# Patient Record
Sex: FEMALE | Race: WHITE
[De-identification: names, ages, dates, MRNs, and addresses within clinical notes are randomized per-mention and may not be internally consistent; named-entity substitution may affect disease eponyms.]

---

## 2018-04-28 ENCOUNTER — HOSPITAL ENCOUNTER (OUTPATIENT)
Dept: HOSPITAL 38 - CC.ED | Age: 63
Setting detail: OBSERVATION
LOS: 1 days | Discharge: HOME | End: 2018-04-29
Attending: FAMILY MEDICINE | Admitting: NURSE PRACTITIONER
Payer: COMMERCIAL

## 2018-04-28 DIAGNOSIS — Z87.891: ICD-10-CM

## 2018-04-28 DIAGNOSIS — R10.13: Primary | ICD-10-CM

## 2018-04-28 DIAGNOSIS — E78.00: ICD-10-CM

## 2018-04-28 DIAGNOSIS — I10: ICD-10-CM

## 2018-04-28 DIAGNOSIS — Z90.710: ICD-10-CM

## 2018-04-28 DIAGNOSIS — Z90.49: ICD-10-CM

## 2018-04-28 DIAGNOSIS — K21.9: ICD-10-CM

## 2018-04-28 LAB
CHLORIDE SERPL-SCNC: 102 MEQ/L (ref 98–106)
SODIUM SERPL-SCNC: 141 MEQ/L (ref 136–145)

## 2018-04-28 PROCEDURE — 85610 PROTHROMBIN TIME: CPT

## 2018-04-28 PROCEDURE — 81001 URINALYSIS AUTO W/SCOPE: CPT

## 2018-04-28 PROCEDURE — 96375 TX/PRO/DX INJ NEW DRUG ADDON: CPT

## 2018-04-28 PROCEDURE — 85379 FIBRIN DEGRADATION QUANT: CPT

## 2018-04-28 PROCEDURE — 96374 THER/PROPH/DIAG INJ IV PUSH: CPT

## 2018-04-28 PROCEDURE — 83615 LACTATE (LD) (LDH) ENZYME: CPT

## 2018-04-28 PROCEDURE — 99285 EMERGENCY DEPT VISIT HI MDM: CPT

## 2018-04-28 PROCEDURE — 74177 CT ABD & PELVIS W/CONTRAST: CPT

## 2018-04-28 PROCEDURE — 36415 COLL VENOUS BLD VENIPUNCTURE: CPT

## 2018-04-28 PROCEDURE — 82550 ASSAY OF CK (CPK): CPT

## 2018-04-28 PROCEDURE — 82150 ASSAY OF AMYLASE: CPT

## 2018-04-28 PROCEDURE — 96376 TX/PRO/DX INJ SAME DRUG ADON: CPT

## 2018-04-28 PROCEDURE — 93005 ELECTROCARDIOGRAM TRACING: CPT

## 2018-04-28 PROCEDURE — G0378 HOSPITAL OBSERVATION PER HR: HCPCS

## 2018-04-28 PROCEDURE — C9113 INJ PANTOPRAZOLE SODIUM, VIA: HCPCS

## 2018-04-28 PROCEDURE — 86140 C-REACTIVE PROTEIN: CPT

## 2018-04-28 PROCEDURE — 80048 BASIC METABOLIC PNL TOTAL CA: CPT

## 2018-04-28 PROCEDURE — 71046 X-RAY EXAM CHEST 2 VIEWS: CPT

## 2018-04-28 PROCEDURE — 80053 COMPREHEN METABOLIC PANEL: CPT

## 2018-04-28 PROCEDURE — 85025 COMPLETE CBC W/AUTO DIFF WBC: CPT

## 2018-04-28 PROCEDURE — 84484 ASSAY OF TROPONIN QUANT: CPT

## 2018-04-28 NOTE — EDM.PDOC
ED HPI GENERAL MEDICAL PROBLEM





- General


Chief Complaint: Chest Pain


Stated Complaint: pressure, ? MI


Time Seen by Provider: 04/28/18 07:04


Source of Information: Reports: Patient


History Limitations: Reports: No Limitations





- History of Present Illness


INITIAL COMMENTS - FREE TEXT/NARRATIVE: 





This patient is a 63 year old female that presents to the ER. Patient reports 

that started about 3 days ago with achy pressure in her middle back between the 

shouder blades. She reports the pressure sensation wrapped around bilateral 

sides into her chest. She reports the pain is also to the left neck, this pain 

is easily manipulated with ROM of the neck. Patient reports that she has also 

been nauseated the last 3 days. Patient reports that the pain comes and goes, 

waxes and wanes. She reports the pain yesterday afternoon while in her kitchen 

was a 8/10. She reports that she does drink alcohol about 3 cocktails a day. 

She denies smoking. She reports history of HTN and high cholesterol. She 

reports that she has history of ulcer several years ago. The patient is alert 

and oriented. She is conversing in full and complete sentences without 

difficulty. The patient reports her pain in the ER is 2 out of 10 on scale. The 

patient does report she has had pain in Epigastric region that feels like 

pressure, gnawing, and acid reflux she reports. She reports this has been there 

for about 3 days when this other pain began as well per patient. She reports 

she has also had diarrhea for about 2 weeks. 


Onset Date: 04/25/18


Duration: Day(s): (3)


Location: Reports: Chest, Abdomen, Back


Quality: Reports: Pressure


Severity: Mild


Improves with: Reports: None


Worsens with: Reports: None


Associated Symptoms: Reports: Chest Pain.  Denies: Confusion, Cough, cough w 

sputum


  ** Upper Back


Pain Score (Numeric/FACES): 3





- Related Data


 Allergies











Allergy/AdvReac Type Severity Reaction Status Date / Time


 


No Known Allergies Allergy   Verified 04/28/18 06:50











Home Meds: 


 Home Meds





. [No Known Home Meds]  04/28/18 [History]











Past Medical History





- Past Surgical History


GI Surgical History: Reports: Appendectomy, Cholecystectomy


Female  Surgical History: Reports: Hysterectomy





Social & Family History





- Tobacco Use


Smoking Status *Q: Former Smoker


Used Tobacco, but Quit: Yes


Month/Year Tobacco Last Used: 02/2018





- Caffeine Use


Caffeine Use: Reports: Coffee





- Alcohol Use


Days Per Week of Alcohol Use: 7


Number of Drinks Per Day: 7


Total Drinks Per Week: 49





- Recreational Drug Use


Recreational Drug Use: No





ED ROS GENERAL





- Review of Systems


Review Of Systems: See Below


Constitutional: Reports: No Symptoms


HEENT: Reports: No Symptoms


Respiratory: Reports: No Symptoms.  Denies: Shortness of Breath


Cardiovascular: Reports: Chest Pain.  Denies: Dyspnea on Exertion, Edema, 

Lightheadedness, Palpitations, Syncope


Endocrine: Reports: No Symptoms


GI/Abdominal: Reports: Abdominal Pain (Epigastric), Diarrhea, Nausea.  Denies: 

Black Stool, Bloody Stool, Constipation, Decreased Appetite, Difficulty 

Swallowing, Distension, Stool Incontinence, Vomiting


: Reports: No Symptoms


Musculoskeletal: Reports: Neck Pain (left lateral neck), Back Pain (between 

shoulder blades)


Skin: Reports: No Symptoms


Neurological: Reports: No Symptoms


Psychiatric: Reports: No Symptoms


Hematologic/Lymphatic: Reports: No Symptoms


Immunologic: Reports: No Symptoms





ED EXAM, GENERAL





- Physical Exam


Exam: See Below


Exam Limited By: No Limitations


General Appearance: Alert, WD/WN, No Apparent Distress


Eye Exam: Bilateral Eye: Normal Inspection, PERRL


Ears: Normal External Exam, Normal Canal, Hearing Grossly Normal, Normal TMs


Ear Exam: Bilateral Ear: Auricle Normal, Canal Normal, TM normal


Nose: Normal Inspection, Normal Mucosa, No Blood


Throat/Mouth: Normal Inspection, Normal Lips, Normal Teeth, Normal Gums, Normal 

Oropharynx, Normal Voice, No Airway Compromise


Head: Atraumatic, Normocephalic


Neck: Normal Inspection, Supple, Non-Tender, Full Range of Motion, Other (Pain 

of the left lateral neck is made worse with ROM. Easily manipulated on exam. )


Respiratory/Chest: No Respiratory Distress, Lungs Clear, Normal Breath Sounds, 

No Accessory Muscle Use, Chest Non-Tender


Cardiovascular: Normal Peripheral Pulses, Regular Rate, Rhythm, No Edema, No 

Gallop, No JVD, No Murmur, No Rub


GI/Abdominal: Normal Bowel Sounds, Soft, No Organomegaly, No Distention, No 

Abnormal Bruit, No Mass, Pelvis Stable, Tender (Epigastric).  No: Guarding, 

Rebound, Hernia, Hepatomegaly, Splenomegaly


 (Female) Exam: Deferred


Rectal (Female) Exam: Deferred


Back Exam: Normal Inspection, Full Range of Motion.  No: CVA Tenderness (L), 

CVA Tenderness (R), Decreased Range of Motion, Muscle Spasm, Paraspinal 

Tenderness, Vertebral Tenderness


Extremities: Normal Inspection, Normal Range of Motion, Non-Tender, No Pedal 

Edema, Normal Capillary Refill


Neurological: Alert, Oriented, Normal Cognition, Normal Gait, No Motor/Sensory 

Deficits


Psychiatric: Normal Affect, Normal Mood


Skin Exam: Warm, Dry, Intact, Normal Color, No Rash


Lymphatic: No Adenopathy





EKG INTERPRETATION


EKG Date: 04/28/18


Time: 06:46


Rhythm: NSR


Rate (Beats/Min): 81


Axis: Normal


ST-T: Normal


QT: Normal


Comparison: NA - No Prior EKG





Course





- Vital Signs


Last Recorded V/S: 


 Last Vital Signs











Temp  96.3 F   04/28/18 06:50


 


Pulse  98   04/28/18 10:16


 


Resp  18   04/28/18 10:16


 


BP  172/88 H  04/28/18 10:16


 


Pulse Ox  98   04/28/18 10:16














- Orders/Labs/Meds


Orders: 


 Active Orders 24 hr











 Category Date Time Status


 


 Patient Status [ADT] Routine ADT  04/28/18 16:01 Active


 


 Ambulate [RC] ASDIRECTED Care  04/28/18 16:01 Active


 


 Ambulate [RC] PER UNIT ROUTINE Care  04/28/18 16:01 Active


 


 EKG Documentation Completion [RC] STAT Care  04/28/18 12:55 Active


 


 Notify Provider Vital Signs [RC] ASDIRECTED Care  04/28/18 16:01 Active


 


 Oxygen Therapy [RC] PRN Care  04/28/18 16:01 Active


 


 VTE/DVT Education [RC] PER UNIT ROUTINE Care  04/28/18 16:01 Active


 


 Vital Signs [RC] Q4H Care  04/28/18 16:01 Active


 


 Clear Liquid Diet [DIET] Diet  04/28/18 Dinner Active


 


 Abdomen Pelvis w Cont [CT] Stat Exams  04/28/18 13:10 Taken


 


 Chest 2V [CR] Stat Exams  04/28/18 06:57 Taken


 


 C-REACTIVE PROTEIN [CHEM] DAILY Lab  04/29/18 05:00 Ordered


 


 CBC WITH AUTO DIFF [HEME] DAILY Lab  04/29/18 05:00 Ordered


 


 CBC WITH AUTO DIFF [HEME] DAILY Lab  04/30/18 05:00 Ordered


 


 COMPREHENSIVE METABOLIC PN,CMP [CHEM] DAILY Lab  04/29/18 05:00 Ordered


 


 URINALYSIS W/MICROSCOPIC [UA W/MICROSCOPIC] [URIN] Stat Lab  04/28/18 07:36 

Ordered


 


 Enoxaparin [Lovenox] Med  04/28/18 16:00 Active





 40 mg SUBCUT Q24H   


 


 Morphine Med  04/28/18 16:01 Active





 2 mg IVPUSH Q2H PRN   


 


 Ondansetron [Zofran] Med  04/28/18 16:01 Active





 4 mg IV Q6H PRN   


 


 Pantoprazole [ProTONIX IV***] 80 mg Med  04/28/18 16:01 Active





 Sodium Chloride 0.9% [Normal Saline] 100 ml   





 IV .CONTINUOUS   


 


 Sodium Chloride 0.9% [Normal Saline] 1,000 ml Med  04/28/18 16:01 Active





 IV ASDIRECTED   


 


 Resuscitation Status Routine Resus Stat  04/28/18 15:38 Ordered








 Medication Orders





Enoxaparin Sodium (Lovenox)  40 mg SUBCUT Q24H KIMMY


Pantoprazole Sodium 80 mg/ (Sodium Chloride)  100 mls @ 10 mls/hr IV 

.CONTINUOUS KIMMY


Sodium Chloride (Normal Saline)  1,000 mls @ 90 mls/hr IV ASDIRECTED KIMMY


Morphine Sulfate (Morphine)  2 mg IVPUSH Q2H PRN


   PRN Reason: Pain (severe 7-10)


Ondansetron HCl (Zofran)  4 mg IV Q6H PRN


   PRN Reason: Nausea/Vomiting








Labs: 


 Laboratory Tests











  04/28/18 04/28/18 04/28/18 Range/Units





  06:50 06:57 06:57 


 


WBC  12.6 H    (5.0-10.0)  10^3/uL


 


RBC  4.53    (4.00-5.50)  10^6/uL


 


Hgb  14.9    (12.0-16.0)  g/dL


 


Hct  44.5    (37.0-47.0)  %


 


MCV  98.2 H    (82.0-94.0)  fL


 


MCH  32.9 H    (27.0-32.0)  pg


 


MCHC  33.5    (33.0-38.0)  g/dL


 


RDW Coeff of Lin  13.8    (11.0-15.0)  %


 


Plt Count  358    (150-400)  10^3/uL


 


Neut % (Auto)  74.4    (35-85)  %


 


Lymph % (Auto)  17.8    (10-55)  %


 


Mono % (Auto)  6.0    (0-16)  %


 


Eos % (Auto)  1.6    (0-5)  %


 


Baso % (Auto)  0.2    (0-3)  %


 


Neut # (Auto)  9.33 H    (1.80-7.00)  10^3/uL


 


Lymph # (Auto)  2.24    (1.00-4.80)  10^3/uL


 


Mono # (Auto)  0.75    (0.00-0.80)  10^3/uL


 


Eos # (Auto)  0.20    (0.00-0.45)  10^3/uL


 


Baso # (Auto)  0.03    10^3/uL


 


PT   9.7   (9.7-12.3)  SEC


 


INR   0.93   (0.92-1.18)  


 


D-Dimer, Quantitative   < 0.19   (0.00-0.50)  


 


Sodium    141  (136-145)  mEq/L


 


Potassium    4.1  (3.5-5.0)  mEq/L


 


Chloride    102  ()  mEq/L


 


Carbon Dioxide    29  (21-32)  mmol/L


 


BUN    12  (7-18)  mg/dL


 


Creatinine    0.9  (0.6-1.0)  mg/dL


 


Est Cr Clr Drug Dosing    47.65  mL/min


 


Estimated GFR (MDRD)    > 60  (>=60)  mL/min


 


Glucose    134 H  (75-99)  mg/dL


 


Calcium    8.9  (8.4-10.1)  mg/dL


 


Lactate Dehydrogenase    220 H  (100-190)  U/L


 


Creatine Kinase    86  ()  U/L


 


Troponin I    < 0.017  (0.00-0.06)  ng/mL


 


Amylase    54  ()  U/L


 


Urine Color     (YELLOW)  


 


Urine Appearance     (CLEAR)  


 


Urine pH     (4.5-8.0)  


 


Ur Specific Gravity     (1.003-1.020)  


 


Urine Protein     (NEGATIVE)  mg/dL


 


Urine Glucose (UA)     (NEGATIVE)  mg/dL


 


Urine Ketones     (NEGATIVE)  mg/dL


 


Urine Occult Blood     (NEGATIVE)  


 


Urine Nitrite     (NEGATIVE)  


 


Urine Bilirubin     (NEGATIVE)  


 


Urine Urobilinogen     (0.2-1.0)  EU/dL


 


Ur Leukocyte Esterase     (NEGATIVE)  


 


Urine RBC     (0-5)  /HPF


 


Urine WBC     (0-5)  /HPF














  04/28/18 04/28/18 04/28/18 Range/Units





  07:36 09:08 11:00 


 


WBC     (5.0-10.0)  10^3/uL


 


RBC     (4.00-5.50)  10^6/uL


 


Hgb     (12.0-16.0)  g/dL


 


Hct     (37.0-47.0)  %


 


MCV     (82.0-94.0)  fL


 


MCH     (27.0-32.0)  pg


 


MCHC     (33.0-38.0)  g/dL


 


RDW Coeff of Lin     (11.0-15.0)  %


 


Plt Count     (150-400)  10^3/uL


 


Neut % (Auto)     (35-85)  %


 


Lymph % (Auto)     (10-55)  %


 


Mono % (Auto)     (0-16)  %


 


Eos % (Auto)     (0-5)  %


 


Baso % (Auto)     (0-3)  %


 


Neut # (Auto)     (1.80-7.00)  10^3/uL


 


Lymph # (Auto)     (1.00-4.80)  10^3/uL


 


Mono # (Auto)     (0.00-0.80)  10^3/uL


 


Eos # (Auto)     (0.00-0.45)  10^3/uL


 


Baso # (Auto)     10^3/uL


 


PT     (9.7-12.3)  SEC


 


INR     (0.92-1.18)  


 


D-Dimer, Quantitative     (0.00-0.50)  


 


Sodium     (136-145)  mEq/L


 


Potassium     (3.5-5.0)  mEq/L


 


Chloride     ()  mEq/L


 


Carbon Dioxide     (21-32)  mmol/L


 


BUN     (7-18)  mg/dL


 


Creatinine     (0.6-1.0)  mg/dL


 


Est Cr Clr Drug Dosing     mL/min


 


Estimated GFR (MDRD)     (>=60)  mL/min


 


Glucose     (75-99)  mg/dL


 


Calcium     (8.4-10.1)  mg/dL


 


Lactate Dehydrogenase     (100-190)  U/L


 


Creatine Kinase     ()  U/L


 


Troponin I   < 0.017  < 0.017  (0.00-0.06)  ng/mL


 


Amylase     ()  U/L


 


Urine Color  Yellow    (YELLOW)  


 


Urine Appearance  Clear    (CLEAR)  


 


Urine pH  6.0    (4.5-8.0)  


 


Ur Specific Gravity  1.025 H    (1.003-1.020)  


 


Urine Protein  Negative    (NEGATIVE)  mg/dL


 


Urine Glucose (UA)  Negative    (NEGATIVE)  mg/dL


 


Urine Ketones  Negative    (NEGATIVE)  mg/dL


 


Urine Occult Blood  Negative    (NEGATIVE)  


 


Urine Nitrite  Negative    (NEGATIVE)  


 


Urine Bilirubin  Negative    (NEGATIVE)  


 


Urine Urobilinogen  0.2    (0.2-1.0)  EU/dL


 


Ur Leukocyte Esterase  Negative    (NEGATIVE)  


 


Urine RBC  Not seen    (0-5)  /HPF


 


Urine WBC  Not seen    (0-5)  /HPF











Meds: 


Medications











Generic Name Dose Route Start Last Admin





  Trade Name Freq  PRN Reason Stop Dose Admin


 


Enoxaparin Sodium  40 mg  04/28/18 16:00  





  Lovenox  SUBCUT   





  Q24H KIMMY   





     





     





     





     


 


Pantoprazole Sodium 80 mg/  100 mls @ 10 mls/hr  04/28/18 16:01  





  Sodium Chloride  IV   





  .CONTINUOUS KIMMY   





     





     





     





     


 


Sodium Chloride  1,000 mls @ 90 mls/hr  04/28/18 16:01  





  Normal Saline  IV   





  ASDIRECTED [facility]   





     





     





     





     


 


Morphine Sulfate  2 mg  04/28/18 16:01  





  Morphine  IVPUSH   





  Q2H PRN   





  Pain (severe 7-10)   





     





     





     


 


Ondansetron HCl  4 mg  04/28/18 16:01  





  Zofran  IV   





  Q6H PRN   





  Nausea/Vomiting   





     





     





     














Discontinued Medications














Generic Name Dose Route Start Last Admin





  Trade Name Freq  PRN Reason Stop Dose Admin


 


Iopamidol  100 ml  04/28/18 13:31  04/28/18 13:41





  Isovue-300 (61%)  IVPUSH  04/28/18 13:32  100 ml





  ONETIME ONE   Administration





     





     





     





     


 


Morphine Sulfate  4 mg  04/28/18 11:33  04/28/18 11:52





  Morphine  IVPUSH  04/28/18 11:34  4 mg





  ONETIME ONE   Administration





     





     





     





     


 


Morphine Sulfate  4 mg  04/28/18 13:13  04/28/18 13:25





  Morphine  IVPUSH  04/28/18 13:14  4 mg





  ONETIME ONE   Administration





     





     





     





     


 


Ondansetron HCl  4 mg  04/28/18 11:33  04/28/18 11:53





  Zofran  IVPUSH  04/28/18 11:34  4 mg





  NOW STA   Administration





     





     





     





     


 


Ondansetron HCl  4 mg  04/28/18 13:13  04/28/18 13:25





  Zofran  IVPUSH  04/28/18 13:14  4 mg





  NOW STA   Administration





     





     





     





     


 


Pantoprazole Sodium  80 mg  04/28/18 08:04  04/28/18 09:12





  Protonix Iv***  IVPUSH  04/28/18 08:05  80 mg





  ONETIME ONE   Administration





     





     





     





     


 


Sucralfate  1 gm  04/28/18 11:33  04/28/18 11:52





  Carafate  PO  04/28/18 11:34  1 gm





  ONETIME ONE   Administration





     





     





     





     














- Radiology Interpretation


Free Text/Narrative:: 





CXR: No acute findings.





- Re-Assessments/Exams


Free Text/Narrative Re-Assessment/Exam: 





04/28/18 08:05


Patient pain described in epigastric region with gnawing and nausea to the 

back. Patent has mild wbc elevation. I will ct the patient due to these 

findings. Ulcer is strong possible diagnosis. Perforation is less likely, but 

possible. Her BP is elevated, HR is normal. Vitals stable. The patient pain is 

easily manipulated by palpating the epigastric region. I do not believe this 

cardiac in nature with pain being manipulated. 





04/28/18 08:09


The patient has refused the CT scan. So, at this time will treat as ulcer. I 

will give her Protonix IV. I will redraw timed cardiac enzymes of troponin. If 

these are negative, will discharge home. Will educate patient to return for 

death, worsening of conditions, or any other concerns. I will have her see her 

PCP for close followup on Monday or GI for possible Ulcer. Explained to the 

patient risks of not having CT. I encouraged her to have this test, but she 

refuses.





04/28/18 12:42


Patient reports after Carafate, Zofran, Morphine that her pain is completely 

resolved. She has had three negative troponins. I will discharge patient. 





04/28/18 13:32


The patient now at discharge reports he pain has returned and is a 9/10. I have 

talked to the patient again. Again, I encourage her to have a CT scan 

performed. Now, she will have this test done. She has agreed to have it done. I 

have given the patient more pain medication, delayed her discharge, and will 

CT. Patient reports she believed the Carafate made her pain worse. 





04/28/18 15:33


I have reviewed patients ct report with radiologist. The patient has told me 

that she does not want to know about ct results if any cancer concerns are 

involved. She has stressed that she does not want me to tell her this 

information. I will allow her PCP to discuss further results with patient. So, 

I have told her that she has a fatty liver on ct and no other explanation for 

her epigastric pain at this time via CT. I did tell her that I believe ulcer is 

a possibility for her pain. I did offer her admission due to her return of 

pain. I feel that if she goes home, pain will return and she will return to the 

ER. The patient reports that she does not see doctors or take medications 

because she does not want to know anything bad with her health. She has 

accepted admission. Patient did attempt to eat toast and reported it made her 

nauseated. 

















Departure





- Departure


Time of Disposition: 12:39


Disposition: Refer to Observation


Condition: Fair


Clinical Impression: 


 Esophagitis, Atypical chest pain, Peptic ulcer





Abdominal pain


Qualifiers:


 Abdominal location: epigastric Qualified Code(s): R10.13 - Epigastric pain








- My Orders


Last 24 Hours: 


My Active Orders





04/28/18 06:57


Chest 2V [CR] Stat 





04/28/18 07:36


URINALYSIS W/MICROSCOPIC [UA W/MICROSCOPIC] [URIN] Stat 





04/28/18 12:55


EKG Documentation Completion [RC] STAT 





04/28/18 13:10


Abdomen Pelvis w Cont [CT] Stat 





04/28/18 15:38


Resuscitation Status Routine 





04/28/18 16:00


Enoxaparin [Lovenox]   40 mg SUBCUT Q24H 





04/28/18 16:01


Patient Status [ADT] Routine 


Ambulate [RC] ASDIRECTED 


Ambulate [RC] PER UNIT ROUTINE 


Notify Provider Vital Signs [RC] ASDIRECTED 


Oxygen Therapy [RC] PRN 


VTE/DVT Education [RC] PER UNIT ROUTINE 


Vital Signs [RC] Q4H 


Morphine   2 mg IVPUSH Q2H PRN 


Ondansetron [Zofran]   4 mg IV Q6H PRN 


Pantoprazole [ProTONIX IV***] 80 mg   Sodium Chloride 0.9% [Normal Saline] 100 

ml IV .CONTINUOUS 


Sodium Chloride 0.9% [Normal Saline] 1,000 ml IV ASDIRECTED 





04/28/18 Dinner


Clear Liquid Diet [DIET] 





04/29/18 05:00


C-REACTIVE PROTEIN [CHEM] DAILY 


CBC WITH AUTO DIFF [HEME] DAILY 


COMPREHENSIVE METABOLIC PN,CMP [CHEM] DAILY 





04/30/18 05:00


CBC WITH AUTO DIFF [HEME] DAILY 














- Assessment/Plan


Last 24 Hours: 


My Active Orders





04/28/18 06:57


Chest 2V [CR] Stat 





04/28/18 07:36


URINALYSIS W/MICROSCOPIC [UA W/MICROSCOPIC] [URIN] Stat 





04/28/18 12:55


EKG Documentation Completion [RC] STAT 





04/28/18 13:10


Abdomen Pelvis w Cont [CT] Stat 





04/28/18 15:38


Resuscitation Status Routine 





04/28/18 16:00


Enoxaparin [Lovenox]   40 mg SUBCUT Q24H 





04/28/18 16:01


Patient Status [ADT] Routine 


Ambulate [RC] ASDIRECTED 


Ambulate [RC] PER UNIT ROUTINE 


Notify Provider Vital Signs [RC] ASDIRECTED 


Oxygen Therapy [RC] PRN 


VTE/DVT Education [RC] PER UNIT ROUTINE 


Vital Signs [RC] Q4H 


Morphine   2 mg IVPUSH Q2H PRN 


Ondansetron [Zofran]   4 mg IV Q6H PRN 


Pantoprazole [ProTONIX IV***] 80 mg   Sodium Chloride 0.9% [Normal Saline] 100 

ml IV .CONTINUOUS 


Sodium Chloride 0.9% [Normal Saline] 1,000 ml IV ASDIRECTED 





04/28/18 Dinner


Clear Liquid Diet [DIET] 





04/29/18 05:00


C-REACTIVE PROTEIN [CHEM] DAILY 


CBC WITH AUTO DIFF [HEME] DAILY 


COMPREHENSIVE METABOLIC PN,CMP [CHEM] DAILY 





04/30/18 05:00


CBC WITH AUTO DIFF [HEME] DAILY 











Plan: 





PLEASE SEE RN NOTE FOR PFSH. 


PLEASE USE ER H&P AS ADMIT H&P.

## 2018-04-29 LAB
CHLORIDE SERPL-SCNC: 106 MEQ/L (ref 98–106)
SODIUM SERPL-SCNC: 142 MEQ/L (ref 136–145)

## 2018-04-29 NOTE — [id number]
**Discharge Summary





- Hospital Course


HPI Initial Comments: 





This patient is a 63 year old female that was admitted yesterday for 

uncontrolled abdominal pain. She had pain to the epigastric region that was 

made worse with eating, and palpation of the epigastric region. The patient 

today has improvmement in labs. Her labs today are unremarkable. The patient 

was placed on a protonix gtt. I believe she has a ulcer. Her diet was also 

changed to clear liquid. She reports this morning that her pain is completely 

resolved. She reports that she has had zero pain since yesterday. She reports 

she believes the Protonix gtt and diet change has been what has helped her. She 

reports she has not felt this good in weeks. She reports that she is ready to 

go home. She does not want to stay for admit continued. She reports that she 

will see her PCP on Monday. I have educated the patient that she may return to 

the ER for worsening of condition or any other emergent concerns. 





- Discharge Data


Discharge Date: 04/29/18


Discharge Disposition: Home, Self-Care 01


Condition: Good





- Patient Instructions


Diet: Clear Liquid Diet


Activity: As Tolerated


Showering/Bathing: May Shower


Notify Provider of: Fever, Increased Pain, Nausea and/or Vomiting





- Discharge Plan


Home Medications: 


 Home Meds





. [No Known Home Meds]  04/28/18 [History]








Forms:  ED Department Discharge


Referrals: 


Mick Balderrama MD [Primary Care Provider] - 





- Discharge Summary/Plan Comment


DC Time >30 min.: No





- General Info


Functional Status: Reports: Pain Controlled





- Review of Systems


General: Reports: No Symptoms


HEENT: Reports: No Symptoms


Pulmonary: Reports: No Symptoms


Cardiovascular: Reports: No Symptoms


Gastrointestinal: Reports: No Symptoms


Genitourinary: Reports: No Symptoms


Musculoskeletal: Reports: No Symptoms


Skin: Reports: No Symptoms


Neurological: Reports: No Symptoms


Psychiatric: Reports: No Symptoms





- Patient Data


Vitals - Most Recent: 


 Last Vital Signs











Temp  97.6 F   04/29/18 08:00


 


Pulse  95   04/29/18 08:00


 


Resp  20   04/29/18 08:00


 


BP  163/83 H  04/29/18 08:00


 


Pulse Ox  97   04/29/18 08:00











Weight - Most Recent: 141 lb 4.8 oz


I&O - Last 24 hours: 


 Intake & Output











 04/28/18 04/29/18 04/29/18





 22:59 06:59 14:59


 


Intake Total  1000 


 


Balance  1000 











Lab Results - Last 24 hrs: 


 Laboratory Results - last 24 hr











  04/28/18 04/29/18 04/29/18 Range/Units





  11:00 07:40 07:40 


 


WBC   7.4   (5.0-10.0)  10^3/uL


 


RBC   3.86 L   (4.00-5.50)  10^6/uL


 


Hgb   13.0   (12.0-16.0)  g/dL


 


Hct   38.3   (37.0-47.0)  %


 


MCV   99.2 H   (82.0-94.0)  fL


 


MCH   33.7 H   (27.0-32.0)  pg


 


MCHC   33.9   (33.0-38.0)  g/dL


 


RDW Coeff of Lin   13.6   (11.0-15.0)  %


 


Plt Count   292   (150-400)  10^3/uL


 


Neut % (Auto)   66.9   (35-85)  %


 


Lymph % (Auto)   22.2   (10-55)  %


 


Mono % (Auto)   8.6   (0-16)  %


 


Eos % (Auto)   2.2   (0-5)  %


 


Baso % (Auto)   0.1   (0-3)  %


 


Neut # (Auto)   4.96   (1.80-7.00)  10^3/uL


 


Lymph # (Auto)   1.65   (1.00-4.80)  10^3/uL


 


Mono # (Auto)   0.64   (0.00-0.80)  10^3/uL


 


Eos # (Auto)   0.16   (0.00-0.45)  10^3/uL


 


Baso # (Auto)   0.01   10^3/uL


 


Sodium    142  (136-145)  mEq/L


 


Potassium    4.3  (3.5-5.0)  mEq/L


 


Chloride    106  ()  mEq/L


 


Carbon Dioxide    31  (21-32)  mmol/L


 


BUN    7  (7-18)  mg/dL


 


Creatinine    0.9  (0.6-1.0)  mg/dL


 


Est Cr Clr Drug Dosing    59.12  mL/min


 


Estimated GFR (MDRD)    > 60  (>=60)  mL/min


 


Glucose    112 H  (75-99)  mg/dL


 


Calcium    7.8 L  (8.4-10.1)  mg/dL


 


Total Bilirubin    0.4  (0.0-1.0)  mg/dL


 


AST    20  (15-37)  U/L


 


ALT    28  (12-78)  U/L


 


Alkaline Phosphatase    57  ()  U/L


 


Troponin I  < 0.017    (0.00-0.06)  ng/mL


 


C-Reactive Protein    < 0.2 L  (0.2-0.8)  mg/dL


 


Total Protein    6.2 L  (6.4-8.2)  g/dL


 


Albumin    3.1 L  (3.4-5.0)  g/dL











Med Orders - Current: 


 Current Medications





Enoxaparin Sodium (Lovenox)  40 mg SUBCUT Q24H KIMMY


   Last Admin: 04/28/18 17:30 Dose:  40 mg


Pantoprazole Sodium 80 mg/ (Sodium Chloride)  100 mls @ 10 mls/hr IV 

.CONTINUOUS KIMMY


   Last Admin: 04/29/18 04:53 Dose:  10 mls/hr


Sodium Chloride (Normal Saline)  1,000 mls @ 90 mls/hr IV ASDIRECTED KIMMY


   Last Admin: 04/29/18 04:46 Dose:  90 mls/hr


Morphine Sulfate (Morphine)  2 mg IVPUSH Q2H PRN


   PRN Reason: Pain (severe 7-10)


Ondansetron HCl (Zofran)  4 mg IV Q6H PRN


   PRN Reason: Nausea/Vomiting





Discontinued Medications





Iopamidol (Isovue-300 (61%))  100 ml IVPUSH ONETIME ONE


   Stop: 04/28/18 13:32


   Last Admin: 04/28/18 13:41 Dose:  100 ml


Morphine Sulfate (Morphine)  4 mg IVPUSH ONETIME ONE


   Stop: 04/28/18 11:34


   Last Admin: 04/28/18 11:52 Dose:  4 mg


Morphine Sulfate (Morphine)  4 mg IVPUSH ONETIME ONE


   Stop: 04/28/18 13:14


   Last Admin: 04/28/18 13:25 Dose:  4 mg


Ondansetron HCl (Zofran)  4 mg IVPUSH NOW STA


   Stop: 04/28/18 11:34


   Last Admin: 04/28/18 11:53 Dose:  4 mg


Ondansetron HCl (Zofran)  4 mg IVPUSH NOW STA


   Stop: 04/28/18 13:14


   Last Admin: 04/28/18 13:25 Dose:  4 mg


Pantoprazole Sodium (Protonix Iv***)  80 mg IVPUSH ONETIME ONE


   Stop: 04/28/18 08:05


   Last Admin: 04/28/18 09:12 Dose:  80 mg


Sucralfate (Carafate)  1 gm PO ONETIME ONE


   Stop: 04/28/18 11:34


   Last Admin: 04/28/18 11:52 Dose:  1 gm











- Exam


General: Reports: Alert, Oriented, Cooperative, No Acute Distress


Lungs: Reports: Clear to Auscultation, Normal Respiratory Effort


Cardiovascular: Reports: Regular Rate, Regular Rhythm, No Murmurs


GI/Abdominal Exam: Normal Bowel Sounds, Soft, Non-Tender, No Organomegaly, No 

Distention, No Abnormal Bruit, No Mass, Pelvis Stable


 (Female) Exam: Deferred


Rectal (Female) Exam: Deferred


Back Exam: Reports: Normal Inspection, Full Range of Motion


Extremities: Normal Inspection, Normal Range of Motion, Non-Tender, No Pedal 

Edema, Normal Capillary Refill


Skin: Reports: Warm, Dry, Intact


Neurological: Reports: No New Focal Deficit


Psy/Mental Status: Reports: Alert, Normal Affect, Normal Mood